# Patient Record
Sex: FEMALE | Race: BLACK OR AFRICAN AMERICAN | NOT HISPANIC OR LATINO | Employment: FULL TIME | URBAN - METROPOLITAN AREA
[De-identification: names, ages, dates, MRNs, and addresses within clinical notes are randomized per-mention and may not be internally consistent; named-entity substitution may affect disease eponyms.]

---

## 2017-06-25 ENCOUNTER — HOSPITAL ENCOUNTER (EMERGENCY)
Facility: HOSPITAL | Age: 47
Discharge: HOME | End: 2017-06-25
Attending: EMERGENCY MEDICINE
Payer: MEDICAID

## 2017-06-25 VITALS
TEMPERATURE: 99 F | HEART RATE: 82 BPM | OXYGEN SATURATION: 100 % | BODY MASS INDEX: 26.58 KG/M2 | HEIGHT: 63 IN | WEIGHT: 150 LBS | RESPIRATION RATE: 16 BRPM | SYSTOLIC BLOOD PRESSURE: 111 MMHG | DIASTOLIC BLOOD PRESSURE: 71 MMHG

## 2017-06-25 DIAGNOSIS — L01.00 IMPETIGO: Primary | ICD-10-CM

## 2017-06-25 LAB
B-HCG UR QL: NEGATIVE
CTP QC/QA: YES

## 2017-06-25 PROCEDURE — 81025 URINE PREGNANCY TEST: CPT | Performed by: EMERGENCY MEDICINE

## 2017-06-25 PROCEDURE — 99283 EMERGENCY DEPT VISIT LOW MDM: CPT

## 2017-06-25 RX ORDER — MUPIROCIN 20 MG/G
OINTMENT TOPICAL 3 TIMES DAILY
Qty: 30 G | Refills: 0 | Status: SHIPPED | OUTPATIENT
Start: 2017-06-25 | End: 2018-08-24

## 2017-06-26 NOTE — ED PROVIDER NOTES
"Encounter Date: 2017       History     Chief Complaint   Patient presents with    Abscess     multiple "boils" on my face x 1-2 weeks.  OTC meds not helping.     47 show female with past medical history anxiety/hypertension, presents the ED with chief complaint abscesses to face ×2 weeks.  Patient has been squeezing and expressing abscesses over the past week.  She denies fever or chills.  She denies intraoral lesions.  She denies abscesses to any other part of her body.  Patient denies radiation of symptoms.  Symptoms are constant.  No alleviating or exacerbating factors.  She denies any other symptoms.          Review of patient's allergies indicates:  No Known Allergies  Past Medical History:   Diagnosis Date    Anxiety     Hypertension      Past Surgical History:   Procedure Laterality Date     SECTION       Family History   Problem Relation Age of Onset    No Known Problems Mother     No Known Problems Father      Social History   Substance Use Topics    Smoking status: Current Every Day Smoker     Types: Cigarettes    Smokeless tobacco: Never Used    Alcohol use Yes      Comment: occ     Review of Systems   Constitutional: Negative for activity change, appetite change, chills and fever.   HENT: Negative for congestion, dental problem, facial swelling, rhinorrhea, sore throat and trouble swallowing.    Eyes: Negative for pain.   Respiratory: Negative for apnea, cough, chest tightness, shortness of breath, wheezing and stridor.    Cardiovascular: Negative for chest pain.   Gastrointestinal: Negative for abdominal pain, nausea and vomiting.   Endocrine: Negative.    Genitourinary: Negative for difficulty urinating, dysuria and flank pain.   Musculoskeletal: Negative for back pain, myalgias, neck pain and neck stiffness.   Skin: Positive for rash. Negative for color change, pallor and wound.   Allergic/Immunologic: Negative.    Neurological: Negative for dizziness, syncope, weakness, " light-headedness and headaches.   Hematological: Negative.    Psychiatric/Behavioral: Negative.    All other systems reviewed and are negative.      Physical Exam     Initial Vitals [06/25/17 1705]   BP Pulse Resp Temp SpO2   111/71 82 16 98.5 °F (36.9 °C) 100 %      MAP       84.33         Physical Exam    Nursing note and vitals reviewed.  Constitutional: She appears well-developed and well-nourished. She is not diaphoretic. No distress.   HENT:   Head: Normocephalic and atraumatic.       Eyes: Conjunctivae and EOM are normal. Pupils are equal, round, and reactive to light. Right eye exhibits no discharge. Left eye exhibits no discharge.   Neck: Normal range of motion. Neck supple.   Pulmonary/Chest: Breath sounds normal. No respiratory distress.   Abdominal: Soft. Bowel sounds are normal. She exhibits no distension. There is no tenderness.   Musculoskeletal: Normal range of motion. She exhibits no tenderness.   Neurological: She is alert and oriented to person, place, and time. She has normal strength.   Skin: Skin is warm and dry. Capillary refill takes less than 2 seconds. No rash noted. No erythema.   Psychiatric: She has a normal mood and affect. Her behavior is normal. Judgment and thought content normal.         ED Course   Procedures  Labs Reviewed   POCT URINE PREGNANCY             Medical Decision Making:   Initial Assessment:   47-year-old female chief complaint facial abscesses ×2 weeks.  Differential Diagnosis:   Abscess, cellulitis, impetigo, folliculitis  ED Management:  Patient overall well-appearing, in no acute distress, afebrile, vitals within normal limits.  Patient has been squeezing and expressing material from abscess is over the past week.  At this time, there is a very small, healing pustule to left cheek without surrounding erythema, induration, or fluctuance.  There is also a 1 cm diameter flat, wound to right lower jaw.  No obvious drainage, fluctuance, or induration.  I do not think  either of these wounds are amenable to incision and drainage.  No intraoral lesions.  Patient denies constitutional symptoms.  I suspect this is most likely impetigo, or secondary infection with MRSA.  I will discharge her with Bactroban ointment and have patient follow-up with her primary care physician this week.  She does have an appointment on Thursday.  I do believe this is very fortuitous.  Patient does understand and agree with treatment plan.  Return precautions given.  Other:   I have discussed this case with another health care provider.       <> Summary of the Discussion: I have discussed this case with Dr. Garza.                   ED Course     Clinical Impression:   The encounter diagnosis was Impetigo.                           Leonardo Ram PA-C  06/25/17 1930

## 2017-06-30 ENCOUNTER — HOSPITAL ENCOUNTER (EMERGENCY)
Facility: HOSPITAL | Age: 47
Discharge: HOME | End: 2017-06-30
Attending: EMERGENCY MEDICINE
Payer: MEDICAID

## 2017-06-30 VITALS
RESPIRATION RATE: 14 BRPM | HEIGHT: 63 IN | BODY MASS INDEX: 22.5 KG/M2 | WEIGHT: 127 LBS | TEMPERATURE: 99 F | OXYGEN SATURATION: 99 % | DIASTOLIC BLOOD PRESSURE: 64 MMHG | SYSTOLIC BLOOD PRESSURE: 112 MMHG | HEART RATE: 78 BPM

## 2017-06-30 DIAGNOSIS — R21 RASH: Primary | ICD-10-CM

## 2017-06-30 LAB
B-HCG UR QL: NEGATIVE
CTP QC/QA: YES

## 2017-06-30 PROCEDURE — 96372 THER/PROPH/DIAG INJ SC/IM: CPT

## 2017-06-30 PROCEDURE — 81025 URINE PREGNANCY TEST: CPT | Performed by: NURSE PRACTITIONER

## 2017-06-30 PROCEDURE — 63600175 PHARM REV CODE 636 W HCPCS: Performed by: NURSE PRACTITIONER

## 2017-06-30 PROCEDURE — 99283 EMERGENCY DEPT VISIT LOW MDM: CPT | Mod: 25

## 2017-06-30 RX ORDER — DEXAMETHASONE SODIUM PHOSPHATE 4 MG/ML
8 INJECTION, SOLUTION INTRA-ARTICULAR; INTRALESIONAL; INTRAMUSCULAR; INTRAVENOUS; SOFT TISSUE
Status: COMPLETED | OUTPATIENT
Start: 2017-06-30 | End: 2017-06-30

## 2017-06-30 RX ORDER — HYDROCORTISONE VALERATE CREAM 2 MG/G
CREAM TOPICAL 2 TIMES DAILY
Qty: 15 G | Refills: 0 | Status: SHIPPED | OUTPATIENT
Start: 2017-06-30 | End: 2017-07-10

## 2017-06-30 RX ORDER — HYDROXYZINE HYDROCHLORIDE 25 MG/1
25 TABLET, FILM COATED ORAL EVERY 6 HOURS PRN
Qty: 12 TABLET | Refills: 0 | Status: SHIPPED | OUTPATIENT
Start: 2017-06-30 | End: 2018-08-24

## 2017-06-30 RX ADMIN — DEXAMETHASONE SODIUM PHOSPHATE 8 MG: 4 INJECTION, SOLUTION INTRAMUSCULAR; INTRAVENOUS at 10:06

## 2017-07-01 NOTE — ED PROVIDER NOTES
Encounter Date: 2017    SCRIBE #1 NOTE: I, Juan Oliva, am scribing for, and in the presence of,  Daily De Leon NP. I have scribed the following portions of the note - Other sections scribed: HPI and ROS.       History     Chief Complaint   Patient presents with    Rash     Patient states that she has a rash from warren mupirocin.      CC: Rash    HPI: This 47 y.o. Female with hx of anxiety and HTN presents to ED c/o acute onset rash with associating itching, burning, and stinging. Pt reports neck rash appeared 4 days ago after applying cream to treat a staph infection. She used bactroban on her face, but the rash appeared on her arms and neck. Last night, rash spread bilaterally to pt's shoulders, armpits, inner elbows, and forearms. Pt did not apply cream to anywhere but her face. Pt has not recently changed her lotions, detergents, or other products. Attempted tx by taking benadryl and neosporin with no relief. Pt denies n/v, fever, cough, weakness, and diaphoresis. No further symptoms reported. No alleviating factors.      The history is provided by the patient. No  was used.     Review of patient's allergies indicates:  No Known Allergies  Past Medical History:   Diagnosis Date    Anxiety     Hypertension      Past Surgical History:   Procedure Laterality Date     SECTION       Family History   Problem Relation Age of Onset    No Known Problems Mother     No Known Problems Father      Social History   Substance Use Topics    Smoking status: Current Every Day Smoker     Types: Cigarettes    Smokeless tobacco: Never Used    Alcohol use Yes      Comment: occ     Review of Systems   Constitutional: Negative for diaphoresis and fever.   HENT: Negative for sore throat.    Respiratory: Negative for cough and shortness of breath.    Cardiovascular: Negative for chest pain.   Gastrointestinal: Negative for nausea and vomiting.   Genitourinary: Negative for dysuria.    Musculoskeletal: Negative for back pain.   Skin: Positive for rash (neck, elbow, forearms, shoulders).   Neurological: Negative for weakness.   Hematological: Does not bruise/bleed easily.       Physical Exam     Initial Vitals [06/30/17 2104]   BP Pulse Resp Temp SpO2   (!) 115/58 88 16 99.3 °F (37.4 °C) 100 %      MAP       77         Physical Exam    Constitutional: Vital signs are normal. She appears well-developed and well-nourished.  Non-toxic appearance.   Eyes: EOM are normal.   Neck: Full passive range of motion without pain. Neck supple. No neck rigidity.   Cardiovascular: Normal rate, S1 normal, S2 normal and normal heart sounds. Exam reveals no gallop.    No murmur heard.  Pulmonary/Chest: Effort normal and breath sounds normal. No tachypnea. She has no decreased breath sounds. She has no wheezes. She has no rhonchi. She has no rales.   Neurological: She is alert and oriented to person, place, and time. She has normal strength. Gait normal. GCS eye subscore is 4. GCS verbal subscore is 5. GCS motor subscore is 6.   Skin: Skin is warm and dry. Rash noted. Rash is maculopapular.              ED Course   Procedures  Labs Reviewed   POCT URINE PREGNANCY             Medical Decision Making:   ED Management:  This is a 47-year-old female who presents to the ED with complaints of a pruritic rash.  She is afebrile and well-appearing.  She suspects this may be due to a Bactroban ALLERGY however, the rash appeared where she did not use the ointment.  On exam, there is a maculopapular rash to the neck and inner arms bilaterally.  No vesicles, induration or open wounds.  Decadron injection given.  No evidence to suggest cellulitis, varicella or other serious etiology.  Discharged home with prescriptions for hydroxyzine and Westcort cream.  Instructions given for supportive care and follow-up.  Return precautions given.  Patient's case was discussed with Dr. Ballesteros, who agrees with her plan.            Tunge  Attestation:   Scribe #1: I performed the above scribed service and the documentation accurately describes the services I performed. I attest to the accuracy of the note.    Attending Attestation:           Physician Attestation for Scribe:  Physician Attestation Statement for Scribe #1: I, Daily De Leon NP, reviewed documentation, as scribed by Juan Oliva in my presence, and it is both accurate and complete.                 ED Course     Clinical Impression:   The encounter diagnosis was Rash.    Disposition:   Disposition: Discharged  Condition: Stable                        Daily De Leon NP  07/01/17 0307

## 2017-07-01 NOTE — DISCHARGE INSTRUCTIONS
Please return to the ED for any new or worsening symptoms: chest pain, shortness of breath, loss of consciousness or any other concerns. Please follow up with primary care within in the week. You may also call [phone] for the [facility] same day appointment line.

## 2017-07-01 NOTE — ED TRIAGE NOTES
Pt presents to the ED w/ a rash. Patient reports she was seen on Monday and dx w/ staff infectons. Pt reports she now has a raised rash to her neck and arms. Patient reports itching and pain.

## 2017-08-22 ENCOUNTER — HOSPITAL ENCOUNTER (EMERGENCY)
Facility: HOSPITAL | Age: 47
Discharge: HOME | End: 2017-08-22
Attending: EMERGENCY MEDICINE

## 2017-08-22 VITALS
TEMPERATURE: 99 F | HEIGHT: 69 IN | SYSTOLIC BLOOD PRESSURE: 141 MMHG | DIASTOLIC BLOOD PRESSURE: 79 MMHG | RESPIRATION RATE: 18 BRPM | WEIGHT: 123 LBS | HEART RATE: 77 BPM | BODY MASS INDEX: 18.22 KG/M2

## 2017-08-22 DIAGNOSIS — R21 FACIAL RASH: Primary | ICD-10-CM

## 2017-08-22 PROCEDURE — 99283 EMERGENCY DEPT VISIT LOW MDM: CPT

## 2017-08-22 RX ORDER — METHYLPREDNISOLONE 4 MG/1
TABLET ORAL
Qty: 1 PACKAGE | Refills: 0 | Status: SHIPPED | OUTPATIENT
Start: 2017-08-22 | End: 2018-08-24

## 2017-08-23 NOTE — ED TRIAGE NOTES
Patient has had a reoccurring staph infection to the right side of her cheek. Patient states that she was seen here but never went and got the prescription filled.

## 2017-08-23 NOTE — DISCHARGE INSTRUCTIONS
Stop using topical therapies on your face.  Warm compresses to face only.  Use medications as prescribed

## 2017-08-23 NOTE — ED PROVIDER NOTES
Encounter Date: 2017       History     Chief Complaint   Patient presents with    Rash     open wound to right bottom cheek x 2days.  States a reoccuring infection from antibiotics months ago.     Chief complaint: Rash    History of present illness: Patient is a [age] female presents for emergent consideration of rash present for 2 months.  She states that the rash is present to the right side of the bottom of her facial cheek.  It is constantly present and is itching and burning in character.  She has tried Neosporin, prid, steroid injections, alcohol, peroxide.  She's been prescribed oral antibiotics but did not take them.    The history is provided by the patient. No  was used.     Review of patient's allergies indicates:  No Known Allergies  Past Medical History:   Diagnosis Date    Anxiety     Hypertension      Past Surgical History:   Procedure Laterality Date     SECTION       Family History   Problem Relation Age of Onset    No Known Problems Mother     No Known Problems Father      Social History   Substance Use Topics    Smoking status: Current Every Day Smoker     Types: Cigarettes    Smokeless tobacco: Never Used    Alcohol use Yes      Comment: occ     Review of Systems   Constitutional: Negative for chills, fatigue and fever.   HENT: Negative for congestion, ear discharge, ear pain, postnasal drip, rhinorrhea, sinus pressure, sneezing, sore throat and voice change.    Eyes: Negative for discharge and itching.   Respiratory: Negative for cough, shortness of breath and wheezing.    Cardiovascular: Negative for chest pain, palpitations and leg swelling.   Gastrointestinal: Negative for abdominal pain, constipation, diarrhea, nausea and vomiting.   Endocrine: Negative for polydipsia, polyphagia and polyuria.   Genitourinary: Negative for dysuria, frequency, hematuria, urgency, vaginal bleeding, vaginal discharge and vaginal pain.   Musculoskeletal: Negative for  arthralgias and myalgias.   Skin: Positive for rash. Negative for wound.   Neurological: Negative for dizziness, seizures, syncope, weakness and numbness.   Hematological: Negative for adenopathy. Does not bruise/bleed easily.   Psychiatric/Behavioral: Negative for self-injury and suicidal ideas. The patient is not nervous/anxious.        Physical Exam     Initial Vitals [08/22/17 1903]   BP Pulse Resp Temp SpO2   (!) 141/79 77 18 98.9 °F (37.2 °C) --      MAP       99.67         Physical Exam    Nursing note and vitals reviewed.  Constitutional: She appears well-developed and well-nourished.   HENT:   Head: Normocephalic and atraumatic.   Right Ear: External ear normal.   Left Ear: External ear normal.   Nose: Nose normal.   Eyes: Conjunctivae and EOM are normal. Pupils are equal, round, and reactive to light. Right eye exhibits no discharge. Left eye exhibits no discharge.   Neck: Normal range of motion.   Abdominal: She exhibits no distension.   Musculoskeletal: Normal range of motion.   Neurological: She is alert and oriented to person, place, and time.   Skin: Skin is dry. Capillary refill takes less than 2 seconds. Rash (kin denuded from an irregular area of the right facial cheek approximately 4 cm x 2 cm, and along the belt line.) noted.         ED Course   Procedures  Labs Reviewed - No data to display          Medical Decision Making:   Initial Assessment:   47 y.o. female presents for emergent evaluation of rash  ED Management:  Following a thorough history and physical, the patient was given a prescription for Medrol Dosepak and told to stop using topical medications and treatments for her face other than warm compresses.  She was given the name of Dr. Lindsay, dermatology for follow-up.  Given the duration of which this rash is been present, it most likely does not represent an emergent illness.  Care of the patient discussed with Dr. Handley who agreed with the assessment and plan.                 Attending Attestation:     Physician Attestation Statement for NP/PA:   I have conducted a face to face encounter with this patient in addition to the NP/PA, due to NP/PA Request    Other NP/PA Attestation Additions:      Medical Decision Makin-year-old female with recurrent rash on the cheek.  She also has a lesion on her waistline.  Patient has been putting numerous sentences on the lesions.  No obvious significant bacterial infection.  No evidence of abscess.  I agree with plan.                 ED Course     Clinical Impression:   The encounter diagnosis was Facial rash.    Disposition:   Disposition: Discharged  Condition: Stable                        Ar Hart DNP  17       Alvaro Handley MD  17

## 2017-11-04 ENCOUNTER — HOSPITAL ENCOUNTER (EMERGENCY)
Facility: HOSPITAL | Age: 47
Discharge: HOME | End: 2017-11-04
Attending: EMERGENCY MEDICINE
Payer: MEDICAID

## 2017-11-04 VITALS
BODY MASS INDEX: 22.15 KG/M2 | HEART RATE: 73 BPM | SYSTOLIC BLOOD PRESSURE: 131 MMHG | WEIGHT: 125 LBS | HEIGHT: 63 IN | RESPIRATION RATE: 16 BRPM | TEMPERATURE: 99 F | OXYGEN SATURATION: 100 % | DIASTOLIC BLOOD PRESSURE: 85 MMHG

## 2017-11-04 DIAGNOSIS — R10.11 RIGHT UPPER QUADRANT PAIN: ICD-10-CM

## 2017-11-04 DIAGNOSIS — R14.2 BELCHING: ICD-10-CM

## 2017-11-04 DIAGNOSIS — R10.13 EPIGASTRIC PAIN: ICD-10-CM

## 2017-11-04 DIAGNOSIS — D64.9 ANEMIA, UNSPECIFIED TYPE: ICD-10-CM

## 2017-11-04 DIAGNOSIS — R07.9 CHEST PAIN: Primary | ICD-10-CM

## 2017-11-04 LAB
ALBUMIN SERPL BCP-MCNC: 3.6 G/DL
ALP SERPL-CCNC: 71 U/L
ALT SERPL W/O P-5'-P-CCNC: 12 U/L
ANION GAP SERPL CALC-SCNC: 8 MMOL/L
AST SERPL-CCNC: 15 U/L
B-HCG UR QL: NEGATIVE
BASOPHILS # BLD AUTO: 0.03 K/UL
BASOPHILS NFR BLD: 0.3 %
BILIRUB SERPL-MCNC: 0.2 MG/DL
BNP SERPL-MCNC: 33 PG/ML
BUN SERPL-MCNC: 16 MG/DL
CALCIUM SERPL-MCNC: 9.3 MG/DL
CHLORIDE SERPL-SCNC: 107 MMOL/L
CO2 SERPL-SCNC: 24 MMOL/L
CREAT SERPL-MCNC: 0.9 MG/DL
CTP QC/QA: YES
D DIMER PPP IA.FEU-MCNC: 0.29 MG/L FEU
DIFFERENTIAL METHOD: ABNORMAL
EOSINOPHIL # BLD AUTO: 0.1 K/UL
EOSINOPHIL NFR BLD: 1.6 %
ERYTHROCYTE [DISTWIDTH] IN BLOOD BY AUTOMATED COUNT: 20.4 %
EST. GFR  (AFRICAN AMERICAN): >60 ML/MIN/1.73 M^2
EST. GFR  (NON AFRICAN AMERICAN): >60 ML/MIN/1.73 M^2
GLUCOSE SERPL-MCNC: 89 MG/DL
HCT VFR BLD AUTO: 26.3 %
HGB BLD-MCNC: 8.6 G/DL
LIPASE SERPL-CCNC: 35 U/L
LYMPHOCYTES # BLD AUTO: 2.2 K/UL
LYMPHOCYTES NFR BLD: 25.3 %
MCH RBC QN AUTO: 20.5 PG
MCHC RBC AUTO-ENTMCNC: 32.7 G/DL
MCV RBC AUTO: 63 FL
MONOCYTES # BLD AUTO: 0.8 K/UL
MONOCYTES NFR BLD: 9 %
NEUTROPHILS # BLD AUTO: 5.5 K/UL
NEUTROPHILS NFR BLD: 63.8 %
PLATELET # BLD AUTO: 452 K/UL
PMV BLD AUTO: 10.3 FL
POTASSIUM SERPL-SCNC: 4 MMOL/L
PROT SERPL-MCNC: 7.4 G/DL
RBC # BLD AUTO: 4.19 M/UL
SODIUM SERPL-SCNC: 139 MMOL/L
TROPONIN I SERPL DL<=0.01 NG/ML-MCNC: <0.006 NG/ML
WBC # BLD AUTO: 8.62 K/UL

## 2017-11-04 PROCEDURE — 93010 ELECTROCARDIOGRAM REPORT: CPT | Mod: ,,, | Performed by: INTERNAL MEDICINE

## 2017-11-04 PROCEDURE — 93005 ELECTROCARDIOGRAM TRACING: CPT

## 2017-11-04 PROCEDURE — 25000003 PHARM REV CODE 250: Performed by: EMERGENCY MEDICINE

## 2017-11-04 PROCEDURE — 99284 EMERGENCY DEPT VISIT MOD MDM: CPT

## 2017-11-04 PROCEDURE — 85025 COMPLETE CBC W/AUTO DIFF WBC: CPT

## 2017-11-04 PROCEDURE — 80053 COMPREHEN METABOLIC PANEL: CPT

## 2017-11-04 PROCEDURE — 83690 ASSAY OF LIPASE: CPT

## 2017-11-04 PROCEDURE — 85379 FIBRIN DEGRADATION QUANT: CPT

## 2017-11-04 PROCEDURE — 83880 ASSAY OF NATRIURETIC PEPTIDE: CPT

## 2017-11-04 PROCEDURE — 84484 ASSAY OF TROPONIN QUANT: CPT

## 2017-11-04 PROCEDURE — 81025 URINE PREGNANCY TEST: CPT | Performed by: EMERGENCY MEDICINE

## 2017-11-04 RX ORDER — ESOMEPRAZOLE MAGNESIUM 40 MG/1
40 CAPSULE, DELAYED RELEASE ORAL DAILY
Qty: 30 CAPSULE | Refills: 2 | Status: SHIPPED | OUTPATIENT
Start: 2017-11-04 | End: 2018-08-24

## 2017-11-04 RX ORDER — DOCUSATE SODIUM 100 MG/1
100 CAPSULE, LIQUID FILLED ORAL 2 TIMES DAILY PRN
Qty: 60 CAPSULE | Refills: 2 | Status: SHIPPED | OUTPATIENT
Start: 2017-11-04

## 2017-11-04 RX ORDER — FERROUS SULFATE 325(65) MG
325 TABLET ORAL DAILY
Qty: 30 TABLET | Refills: 2 | Status: SHIPPED | OUTPATIENT
Start: 2017-11-04

## 2017-11-04 RX ORDER — ASPIRIN 81 MG/1
81 TABLET ORAL DAILY
COMMUNITY

## 2017-11-04 RX ADMIN — LIDOCAINE HYDROCHLORIDE: 20 SOLUTION ORAL; TOPICAL at 09:11

## 2017-11-05 NOTE — ED TRIAGE NOTES
Pt presents to ED with c/o epigastric and chest pain that radiates laterally under the right breast to the right midaxillary region and to the back. Pt reports that the pain started yesterday and she's never felt this kind of way.

## 2017-11-05 NOTE — ED PROVIDER NOTES
Encounter Date: 2017    SCRIBE #1 NOTE: I, Elin Mclaughlin, am scribing for, and in the presence of,  Gloria Olvera MD. I have scribed the following portions of the note - Other sections scribed: HPI, ROS, PE.       History     Chief Complaint   Patient presents with    Chest Pain     starting last night pt complains of pain from chest to the back that gets worse when she belch; pt reports taking an aspirin around noon with little to no relief.      CC: Abdominal Pain; Chest Pain    HPI: 47 year old female smoker with HTN, acid reflux, anxiety, hx of anemia, and hx of blood transfusion, presents to the ED c/o epigastric and RUQ abdominal pain that radiates to her R flank and chest since yesterday night while at work. Pain is 8/10. Pt describes the pain as pressure and having a burning sensation similar to her reflux. Pain is exacerbated with deep breathing. Pt works at a [employer]. She states she does a lot of walking and movement at work but has been working at the [employer] for 8 years and none of this activity is new. Pt denies previous similar pain. Pt reports she was diagnosed with acid reflux a year ago and was on nexium but stopped taking the medication several months back. Pt denies eating any unusual or spicy foods that would exacerbate her pain. Pt otherwise denies fever, chills, diaphoresis, weight change, SOB, N/V/D, changes in urinary/bowel habits, and any other associated symptoms. No prior attempted treatment. No alleviating factors.    Pt reports having heavy menstrual cycles that last for 8-10 days. She does have a hx of fibroids.     PSHx: tubal ligation,       The history is provided by the patient. No  was used.     Review of patient's allergies indicates:  No Known Allergies  Past Medical History:   Diagnosis Date    Anxiety     Hypertension      Past Surgical History:   Procedure Laterality Date     SECTION       Family History   Problem Relation  Age of Onset    No Known Problems Mother     No Known Problems Father      Social History   Substance Use Topics    Smoking status: Current Every Day Smoker     Types: Cigarettes    Smokeless tobacco: Never Used    Alcohol use Yes      Comment: occ     Review of Systems   Constitutional: Negative for chills, diaphoresis, fever and unexpected weight change.   HENT: Negative for ear pain and sore throat.    Eyes: Negative for photophobia and visual disturbance.   Respiratory: Negative for cough and shortness of breath.    Cardiovascular: Positive for chest pain.   Gastrointestinal: Positive for abdominal pain (epigastric and RUQ). Negative for diarrhea, nausea and vomiting.   Genitourinary: Positive for flank pain (R) and menstrual problem. Negative for dysuria.   Musculoskeletal: Negative for back pain and neck stiffness.   Skin: Negative for rash.   Neurological: Negative for headaches.       Physical Exam     Initial Vitals [11/04/17 2004]   BP Pulse Resp Temp SpO2   118/70 80 17 98.5 °F (36.9 °C) 100 %      MAP       86         Physical Exam    Nursing note and vitals reviewed.  Constitutional: She appears well-developed and well-nourished. She is not diaphoretic. She does not appear ill. No distress.   Awake, alert, appears non-toxic   HENT:   Head: Normocephalic and atraumatic.   Mouth/Throat: Oropharynx is clear and moist.   Eyes: Conjunctivae and EOM are normal. Pupils are equal, round, and reactive to light.   Neck: Normal range of motion. Neck supple.   Cardiovascular: Normal rate and regular rhythm.   Murmur heard.   Systolic murmur is present with a grade of 2/6   Pulmonary/Chest: Effort normal and breath sounds normal. No respiratory distress. She has no wheezes. She has no rhonchi. She has no rales.   Abdominal: Soft. Normal appearance and bowel sounds are normal. She exhibits no distension. There is tenderness in the epigastric area. There is no rebound and no guarding.   Musculoskeletal: Normal  range of motion. She exhibits no edema or tenderness.   Neurological: She is alert and oriented to person, place, and time. She has normal strength.   Skin: Skin is warm and dry.   Psychiatric: She has a normal mood and affect.         ED Course   US - ED Performed - Abdomen Limited  Date/Time: 11/4/2017 11:00 PM  Performed by: KOSTA WAYNE  Authorized by: KOSTA WAYNE   Comments: RUQ no gallstones. No pericholecystic fluid. No GB wall thickening.        Labs Reviewed   CBC W/ AUTO DIFFERENTIAL   COMPREHENSIVE METABOLIC PANEL   B-TYPE NATRIURETIC PEPTIDE   TROPONIN I   LIPASE   D DIMER, QUANTITATIVE   POCT URINE PREGNANCY     EKG Readings: (Independently Interpreted)   20:10: NSR, HR 73. Normal axis and intervals. No STEMI.       X-Rays:   Independently Interpreted Readings:   Other Readings:  CXR NAD    Medical Decision Making:   History:   Old Medical Records: I decided to obtain old medical records.  Old Records Summarized: records from previous admission(s).  Initial Assessment:   This is an emergent evaluation of a 47 y.o. Female with RUQ and epigastric and chest pain since last PM.  Differential Diagnosis:   Ddx includes ACS, dissection, PE, CHF, GERD, PUD, pancreatitis, biliary colic, choledocholithiasis, cholangitis, cholecystitis, other.  Independently Interpreted Test(s):   I have ordered and independently interpreted X-rays - see prior notes.  I have ordered and independently interpreted EKG Reading(s) - see prior notes  Clinical Tests:   Lab Tests: Ordered and Reviewed  Radiological Study: Ordered and Reviewed  Medical Tests: Ordered and Reviewed  ED Management:  EKG reassuring without STEMI.    CXR without abnormality.    Labs: Hgb 8.6. Negative troponin/BNP/dimer.    Given negative troponin in setting of pain since last PM, low suspicion for ACS.    RUQ sono bedside shows no GB pathology.    Patient pain improved with GI cocktail.    Suspect anemia is somewhat chronic as no  palpitations/SOB, not tachy in ED. Will re-start iron therapy.    Re: abdom pain, suspect gastritis vs GERD vs PUD.     I have rx'ed patient PPI as well as iron/colace for anemia. Referred to PMD for close f/u.            Scribe Attestation:   Scribe #1: I performed the above scribed service and the documentation accurately describes the services I performed. I attest to the accuracy of the note.    Attending Attestation:           Physician Attestation for Scribe:  Physician Attestation Statement for Scribe #1: I, Gloria Olvera MD, reviewed documentation, as scribed by Elin Mclaughlin in my presence, and it is both accurate and complete.                 ED Course      Clinical Impression:   The primary encounter diagnosis was Chest pain. Diagnoses of Right upper quadrant pain, Epigastric pain, Belching, and Anemia, unspecified type were also pertinent to this visit.                           Gloria Olvera MD  11/06/17 0959

## 2018-07-13 ENCOUNTER — HOSPITAL ENCOUNTER (EMERGENCY)
Facility: HOSPITAL | Age: 48
Discharge: HOME | End: 2018-07-13
Attending: EMERGENCY MEDICINE
Payer: MEDICAID

## 2018-07-13 VITALS
HEART RATE: 87 BPM | DIASTOLIC BLOOD PRESSURE: 60 MMHG | WEIGHT: 120 LBS | BODY MASS INDEX: 22.66 KG/M2 | HEIGHT: 61 IN | OXYGEN SATURATION: 100 % | TEMPERATURE: 99 F | SYSTOLIC BLOOD PRESSURE: 108 MMHG | RESPIRATION RATE: 20 BRPM

## 2018-07-13 DIAGNOSIS — R21 FACIAL RASH: Primary | ICD-10-CM

## 2018-07-13 LAB
B-HCG UR QL: NEGATIVE
CTP QC/QA: YES

## 2018-07-13 PROCEDURE — 99283 EMERGENCY DEPT VISIT LOW MDM: CPT

## 2018-07-13 PROCEDURE — 25000003 PHARM REV CODE 250: Performed by: PHYSICIAN ASSISTANT

## 2018-07-13 PROCEDURE — 81025 URINE PREGNANCY TEST: CPT | Performed by: PHYSICIAN ASSISTANT

## 2018-07-13 RX ORDER — MUPIROCIN 20 MG/G
1 OINTMENT TOPICAL
Status: COMPLETED | OUTPATIENT
Start: 2018-07-13 | End: 2018-07-13

## 2018-07-13 RX ORDER — MUPIROCIN 20 MG/G
OINTMENT TOPICAL
Qty: 15 G | Refills: 0 | Status: SHIPPED | OUTPATIENT
Start: 2018-07-13 | End: 2018-08-24

## 2018-07-13 RX ADMIN — MUPIROCIN 22 G: 20 OINTMENT TOPICAL at 07:07

## 2018-07-13 NOTE — ED PROVIDER NOTES
Encounter Date: 2018     SORT:   49 y/o female who presents for evaluation of itchy, painful rash to face x 11 days. She also states that she had a pimple on her face 7 days ago that she popped. Denies fever, vomiting, drainage, difficulty breathing or swallowing. Initial orders placed. Lacey Masters PA-C   History   No chief complaint on file.    48-year-old female with past medical history anxiety, hypertension, presents to ED complaining of rash to right cheek ×11 days.  Denies fever. Denies facial swelling. No wound drainage. Denies trauma. Pt states initially a pimple-like lesion, that is now worsening rash. No radiation of pain. No alleviating/exacerbating factors.           Review of patient's allergies indicates:  No Known Allergies  Past Medical History:   Diagnosis Date    Anxiety     Hypertension      Past Surgical History:   Procedure Laterality Date     SECTION       Family History   Problem Relation Age of Onset    No Known Problems Mother     No Known Problems Father      Social History   Substance Use Topics    Smoking status: Current Every Day Smoker     Types: Cigarettes    Smokeless tobacco: Never Used    Alcohol use Yes      Comment: occ     Review of Systems   Constitutional: Negative for fever.   HENT: Negative for sore throat.    Eyes: Negative.    Respiratory: Negative for shortness of breath.    Cardiovascular: Negative for chest pain.   Gastrointestinal: Negative for nausea.   Endocrine: Negative.    Genitourinary: Negative for dysuria.   Musculoskeletal: Negative for back pain, neck pain and neck stiffness.   Skin: Positive for rash.   Neurological: Negative for weakness and headaches.   Hematological: Does not bruise/bleed easily.   Psychiatric/Behavioral: Negative.    All other systems reviewed and are negative.      Physical Exam     Initial Vitals [18 1849]   BP Pulse Resp Temp SpO2   108/60 87 20 99.1 °F (37.3 °C) 100 %      MAP       --         Physical  "Exam    Nursing note and vitals reviewed.  Constitutional: She appears well-developed and well-nourished. She is not diaphoretic. No distress.   HENT:   Head: Normocephalic and atraumatic.   Nose:       Eyes: Conjunctivae and EOM are normal. Pupils are equal, round, and reactive to light.   Neck: Normal range of motion. Neck supple. No tracheal deviation present.   Cardiovascular: Intact distal pulses.   Pulmonary/Chest: Breath sounds normal. No stridor. No respiratory distress. She has no wheezes.   Abdominal: Soft. Bowel sounds are normal. She exhibits no distension.   Musculoskeletal: Normal range of motion. She exhibits no tenderness.   Lymphadenopathy:     She has no cervical adenopathy.   Neurological: She is alert and oriented to person, place, and time.   Skin: Skin is warm and dry. Capillary refill takes less than 2 seconds.   Psychiatric: She has a normal mood and affect. Her behavior is normal. Judgment and thought content normal.         ED Course   Procedures  Labs Reviewed   POCT URINE PREGNANCY           Medical Decision Making:   Differential Diagnosis:   Rash, dermatitis, impetigo, abscess, folliculitis  ED Management:  47yo F with facial rash x 11 days. Rash is not pruritic, not significantly painful. No drainage. Initially pimple, now small wound. Vitals reassuring. Overall well-appearing and non-toxic. No intraoral lesions, no other rash on exam. No fever. No neck pain or stiffness. I do not suspect this will spread into "danger triangle", however have given pt strict return precautions if any worsening rash. Will d/c with topical mupirocin, return precautions given.                       Clinical Impression:   The encounter diagnosis was Facial rash.      Disposition:   Disposition: Discharged  Condition: Stable                        Leonardo Ram PA-C  07/13/18 6269    "

## 2018-07-14 NOTE — ED TRIAGE NOTES
Patient arrived to ED with c/o rash around mouth and on face x 13 days that itches.  Denies new meds, food, or makeup.  No acute distress noted.

## 2018-08-24 ENCOUNTER — HOSPITAL ENCOUNTER (EMERGENCY)
Facility: HOSPITAL | Age: 48
Discharge: HOME | End: 2018-08-24
Attending: EMERGENCY MEDICINE
Payer: MEDICAID

## 2018-08-24 VITALS
SYSTOLIC BLOOD PRESSURE: 140 MMHG | RESPIRATION RATE: 16 BRPM | WEIGHT: 120 LBS | BODY MASS INDEX: 21.26 KG/M2 | DIASTOLIC BLOOD PRESSURE: 80 MMHG | HEART RATE: 64 BPM | TEMPERATURE: 99 F | OXYGEN SATURATION: 100 % | HEIGHT: 63 IN

## 2018-08-24 DIAGNOSIS — R07.89 CHEST DISCOMFORT: ICD-10-CM

## 2018-08-24 DIAGNOSIS — R07.89 ATYPICAL CHEST PAIN: Primary | ICD-10-CM

## 2018-08-24 DIAGNOSIS — M79.18 MUSCULOSKELETAL PAIN: ICD-10-CM

## 2018-08-24 DIAGNOSIS — R07.9 CHEST PAIN: ICD-10-CM

## 2018-08-24 LAB
ALBUMIN SERPL BCP-MCNC: 3.8 G/DL
ALP SERPL-CCNC: 54 U/L
ALT SERPL W/O P-5'-P-CCNC: 13 U/L
ANION GAP SERPL CALC-SCNC: 6 MMOL/L
ANISOCYTOSIS BLD QL SMEAR: ABNORMAL
AST SERPL-CCNC: 18 U/L
BASOPHILS # BLD AUTO: 0.01 K/UL
BASOPHILS NFR BLD: 0.1 %
BILIRUB SERPL-MCNC: 0.2 MG/DL
BUN SERPL-MCNC: 15 MG/DL
CALCIUM SERPL-MCNC: 8.9 MG/DL
CHLORIDE SERPL-SCNC: 108 MMOL/L
CO2 SERPL-SCNC: 23 MMOL/L
CREAT SERPL-MCNC: 0.9 MG/DL
DACRYOCYTES BLD QL SMEAR: ABNORMAL
DIFFERENTIAL METHOD: ABNORMAL
EOSINOPHIL # BLD AUTO: 0.1 K/UL
EOSINOPHIL NFR BLD: 1.6 %
ERYTHROCYTE [DISTWIDTH] IN BLOOD BY AUTOMATED COUNT: 24 %
EST. GFR  (AFRICAN AMERICAN): >60 ML/MIN/1.73 M^2
EST. GFR  (NON AFRICAN AMERICAN): >60 ML/MIN/1.73 M^2
GLUCOSE SERPL-MCNC: 96 MG/DL
HCT VFR BLD AUTO: 29.8 %
HGB BLD-MCNC: 9.4 G/DL
HYPOCHROMIA BLD QL SMEAR: ABNORMAL
LIPASE SERPL-CCNC: 25 U/L
LYMPHOCYTES # BLD AUTO: 1.5 K/UL
LYMPHOCYTES NFR BLD: 18.4 %
MCH RBC QN AUTO: 21.1 PG
MCHC RBC AUTO-ENTMCNC: 31.5 G/DL
MCV RBC AUTO: 67 FL
MONOCYTES # BLD AUTO: 0.6 K/UL
MONOCYTES NFR BLD: 7.9 %
NEUTROPHILS # BLD AUTO: 5.8 K/UL
NEUTROPHILS NFR BLD: 72.3 %
OVALOCYTES BLD QL SMEAR: ABNORMAL
PLATELET # BLD AUTO: 490 K/UL
PLATELET BLD QL SMEAR: ABNORMAL
PMV BLD AUTO: 9.3 FL
POIKILOCYTOSIS BLD QL SMEAR: SLIGHT
POLYCHROMASIA BLD QL SMEAR: ABNORMAL
POTASSIUM SERPL-SCNC: 3.9 MMOL/L
PROT SERPL-MCNC: 7 G/DL
RBC # BLD AUTO: 4.46 M/UL
SODIUM SERPL-SCNC: 137 MMOL/L
TARGETS BLD QL SMEAR: ABNORMAL
TROPONIN I SERPL DL<=0.01 NG/ML-MCNC: <0.006 NG/ML
WBC # BLD AUTO: 7.99 K/UL

## 2018-08-24 PROCEDURE — 96374 THER/PROPH/DIAG INJ IV PUSH: CPT

## 2018-08-24 PROCEDURE — 84484 ASSAY OF TROPONIN QUANT: CPT

## 2018-08-24 PROCEDURE — 93010 ELECTROCARDIOGRAM REPORT: CPT | Mod: ,,, | Performed by: INTERNAL MEDICINE

## 2018-08-24 PROCEDURE — 63600175 PHARM REV CODE 636 W HCPCS: Performed by: EMERGENCY MEDICINE

## 2018-08-24 PROCEDURE — 93005 ELECTROCARDIOGRAM TRACING: CPT

## 2018-08-24 PROCEDURE — 80053 COMPREHEN METABOLIC PANEL: CPT

## 2018-08-24 PROCEDURE — 99284 EMERGENCY DEPT VISIT MOD MDM: CPT | Mod: 25

## 2018-08-24 PROCEDURE — 85025 COMPLETE CBC W/AUTO DIFF WBC: CPT

## 2018-08-24 PROCEDURE — 83690 ASSAY OF LIPASE: CPT

## 2018-08-24 RX ORDER — KETOROLAC TROMETHAMINE 30 MG/ML
15 INJECTION, SOLUTION INTRAMUSCULAR; INTRAVENOUS
Status: COMPLETED | OUTPATIENT
Start: 2018-08-24 | End: 2018-08-24

## 2018-08-24 RX ORDER — METHOCARBAMOL 500 MG/1
1000 TABLET, FILM COATED ORAL 3 TIMES DAILY
Qty: 30 TABLET | Refills: 0 | Status: SHIPPED | OUTPATIENT
Start: 2018-08-24 | End: 2018-08-29

## 2018-08-24 RX ADMIN — KETOROLAC TROMETHAMINE 15 MG: 30 INJECTION, SOLUTION INTRAMUSCULAR at 10:08

## 2018-08-24 NOTE — ED TRIAGE NOTES
Pt presents to ED with c/o left sided lower chest pain under left breast onset this AM. Pt states pain is worse with movement and radiates to left back. Pt denies SOB, nausea, vomiting or dizziness.

## 2018-08-24 NOTE — ED PROVIDER NOTES
"Encounter Date: 2018    SCRIBE #1 NOTE: I, Hiren Schuler, am scribing for, and in the presence of,  Arturo Beck. I have scribed the following portions of the note - Other sections scribed: HPI and ROS.       History     Chief Complaint   Patient presents with    Chest Pain     left side CP radiating to left back, worse with movement, denies SOB, nausea or vomiting     Chief Complaint: Back Pain    HPI: This 48 y.o. Female with anxiety and HTN presents to the ED c/o acute onset mid thoracic back pain. Symptoms began upon wakening this morning and "rolling over". Pain radiates into the chest. Symptoms are constant, severe, and worse with deep breaths and positional movement. No attempted treatment. No fever, SOB or cough.      The history is provided by the patient. No  was used.     Review of patient's allergies indicates:  No Known Allergies  Past Medical History:   Diagnosis Date    Anxiety     Hypertension      Past Surgical History:   Procedure Laterality Date     SECTION       Family History   Problem Relation Age of Onset    No Known Problems Mother     No Known Problems Father      Social History     Tobacco Use    Smoking status: Current Every Day Smoker     Types: Cigarettes    Smokeless tobacco: Never Used   Substance Use Topics    Alcohol use: Yes     Comment: occ    Drug use: Yes     Types: Marijuana     Review of Systems   Constitutional: Negative for chills and fever.   HENT: Negative for ear pain and sore throat.    Eyes: Negative for pain.   Respiratory: Negative for cough and shortness of breath.    Cardiovascular: Positive for chest pain.   Gastrointestinal: Negative for abdominal pain, diarrhea, nausea and vomiting.   Genitourinary: Negative for dysuria and hematuria.   Musculoskeletal: Positive for back pain. Negative for myalgias (arm or leg pain).   Skin: Negative for rash.   Neurological: Negative for headaches.       Physical Exam     Initial " Vitals [08/24/18 1023]   BP Pulse Resp Temp SpO2   (!) 137/92 82 20 98.3 °F (36.8 °C) 99 %      MAP       --         Physical Exam    Nursing note and vitals reviewed.  Constitutional: She appears well-developed and well-nourished.   HENT:   Head: Normocephalic and atraumatic.   Eyes: Conjunctivae and EOM are normal.   Neck: Neck supple.   Cardiovascular: Normal rate and regular rhythm.   Pulmonary/Chest: Breath sounds normal. No respiratory distress. She has no wheezes.   pleuritic pain in nature   Abdominal: Soft. Bowel sounds are normal. She exhibits no distension. There is no tenderness.   Musculoskeletal: Normal range of motion.        Thoracic back: She exhibits tenderness.   Neurological: She is alert and oriented to person, place, and time.   Skin: Skin is warm and dry.   Psychiatric: She has a normal mood and affect. Her behavior is normal.         ED Course   Procedures  Labs Reviewed   CBC W/ AUTO DIFFERENTIAL   COMPREHENSIVE METABOLIC PANEL   TROPONIN I   TROPONIN I   LIPASE   URINALYSIS     EKG Readings: (Independently Interpreted)   Initial Reading: No STEMI.   Normal sinus rhythm at a rate of 76 normal QT normal intervals possible lead reversal will obtain a 2nd EKG    Sec EKG showed normal sinus rhythm no evidence of ischemia no ST elevation isoelectric at aVL no significant changes from November 2017       Imaging Results    None                     Scribe Attestation:   Scribe #1: I performed the above scribed service and the documentation accurately describes the services I performed. I attest to the accuracy of the note.    Attending Attestation:           Physician Attestation for Scribe:  Physician Attestation Statement for Scribe #1: I, Arturo Beck, reviewed documentation, as scribed by Hiren Schuler in my presence, and it is both accurate and complete.         Attending ED Notes:   I, Dr. Arturo Beck, personally performed the services described in this documentation. All medical  record entries made by the scribe were at my direction and in my presence.  I have reviewed the chart and agree that the record reflects my personal performance and is accurate and complete. Arturo Beck MD.  10:42 AM 08/24/2018    All labs unremarkable, more tender to palpation over muscle...musculoskeletal             Clinical Impression:   The encounter diagnosis was Chest discomfort.      Disposition:   Disposition: Discharged  Condition: Stable                        Arturo Smalls MD  08/25/18 0949

## 2022-08-22 ENCOUNTER — TELEPHONE (OUTPATIENT)
Dept: OBSTETRICS AND GYNECOLOGY | Facility: CLINIC | Age: 52
End: 2022-08-22
Payer: MEDICAID

## 2022-08-22 NOTE — TELEPHONE ENCOUNTER
Received a referral from [facility] for pt. Attempted to contact pt for scheduling if needed. Not a good working number

## 2022-10-25 ENCOUNTER — OFFICE VISIT (OUTPATIENT)
Dept: OBSTETRICS AND GYNECOLOGY | Facility: CLINIC | Age: 52
End: 2022-10-25
Payer: MEDICAID

## 2022-10-25 VITALS
BODY MASS INDEX: 21.26 KG/M2 | SYSTOLIC BLOOD PRESSURE: 124 MMHG | DIASTOLIC BLOOD PRESSURE: 72 MMHG | WEIGHT: 120.06 LBS

## 2022-10-25 DIAGNOSIS — Z12.31 BREAST CANCER SCREENING BY MAMMOGRAM: ICD-10-CM

## 2022-10-25 DIAGNOSIS — Z12.11 ENCOUNTER FOR SCREENING COLONOSCOPY: ICD-10-CM

## 2022-10-25 DIAGNOSIS — Z72.0 TOBACCO ABUSE: ICD-10-CM

## 2022-10-25 DIAGNOSIS — Z01.419 GYNECOLOGIC EXAM NORMAL: Primary | ICD-10-CM

## 2022-10-25 PROCEDURE — 99213 OFFICE O/P EST LOW 20 MIN: CPT | Mod: PBBFAC | Performed by: OBSTETRICS & GYNECOLOGY

## 2022-10-25 PROCEDURE — 3078F PR MOST RECENT DIASTOLIC BLOOD PRESSURE < 80 MM HG: ICD-10-PCS | Mod: CPTII,,, | Performed by: OBSTETRICS & GYNECOLOGY

## 2022-10-25 PROCEDURE — 1159F PR MEDICATION LIST DOCUMENTED IN MEDICAL RECORD: ICD-10-PCS | Mod: CPTII,,, | Performed by: OBSTETRICS & GYNECOLOGY

## 2022-10-25 PROCEDURE — 99386 PR PREVENTIVE VISIT,NEW,40-64: ICD-10-PCS | Mod: S$PBB,,, | Performed by: OBSTETRICS & GYNECOLOGY

## 2022-10-25 PROCEDURE — 3074F PR MOST RECENT SYSTOLIC BLOOD PRESSURE < 130 MM HG: ICD-10-PCS | Mod: CPTII,,, | Performed by: OBSTETRICS & GYNECOLOGY

## 2022-10-25 PROCEDURE — 99999 PR PBB SHADOW E&M-EST. PATIENT-LVL III: CPT | Mod: PBBFAC,,, | Performed by: OBSTETRICS & GYNECOLOGY

## 2022-10-25 PROCEDURE — 88175 CYTOPATH C/V AUTO FLUID REDO: CPT | Performed by: OBSTETRICS & GYNECOLOGY

## 2022-10-25 PROCEDURE — 3078F DIAST BP <80 MM HG: CPT | Mod: CPTII,,, | Performed by: OBSTETRICS & GYNECOLOGY

## 2022-10-25 PROCEDURE — 3074F SYST BP LT 130 MM HG: CPT | Mod: CPTII,,, | Performed by: OBSTETRICS & GYNECOLOGY

## 2022-10-25 PROCEDURE — 99386 PREV VISIT NEW AGE 40-64: CPT | Mod: S$PBB,,, | Performed by: OBSTETRICS & GYNECOLOGY

## 2022-10-25 PROCEDURE — 99999 PR PBB SHADOW E&M-EST. PATIENT-LVL III: ICD-10-PCS | Mod: PBBFAC,,, | Performed by: OBSTETRICS & GYNECOLOGY

## 2022-10-25 PROCEDURE — 87624 HPV HI-RISK TYP POOLED RSLT: CPT | Performed by: OBSTETRICS & GYNECOLOGY

## 2022-10-25 PROCEDURE — 1159F MED LIST DOCD IN RCRD: CPT | Mod: CPTII,,, | Performed by: OBSTETRICS & GYNECOLOGY

## 2022-10-25 RX ORDER — DOXYCYCLINE 100 MG/1
100 CAPSULE ORAL DAILY
COMMUNITY
Start: 2022-08-24

## 2022-10-25 RX ORDER — CLINDAMYCIN PHOSPHATE 11.9 MG/ML
SOLUTION TOPICAL 2 TIMES DAILY
COMMUNITY
Start: 2022-09-07

## 2022-10-25 NOTE — PROGRESS NOTES
Subjective:       Patient ID: Rula Baker is a 52 y.o. female.    Chief Complaint:  Annual Exam      History of Present Illness  HPI  Annual Exam-Premenopausal  Patient presents for annual exam. The patient has no complaints today. The patient is not currently sexually active. GYN screening history: last pap: approximate date 2-3 yrs ago and was normal. The patient wears seatbelts: yes. The patient participates in regular exercise: no. Has the patient ever been transfused or tattooed?: yes. The patient reports that there is not domestic violence in her life.    Pt has been skipping months of menses; but when she does have menses, it is heavy.    Pt needs mammogram.    Pt has never had colonoscopy.    GYN & OB History  Patient's last menstrual period was 2022.   Date of Last Pap: 10/25/2022    OB History    Para Term  AB Living   5         4   SAB IAB Ectopic Multiple Live Births           4      # Outcome Date GA Lbr Jatinder/2nd Weight Sex Delivery Anes PTL Lv   5             4             3             2             1                 Review of Systems  Review of Systems   Constitutional: Negative.    Respiratory: Negative.     Cardiovascular: Negative.    Gastrointestinal: Negative.    Genitourinary: Negative.    Musculoskeletal: Negative.    Neurological: Negative.    Hematological: Negative.    Psychiatric/Behavioral: Negative.     Breast: negative.          Objective:    Physical Exam:   Constitutional: She is oriented to person, place, and time. She appears well-developed and well-nourished. No distress.    HENT:   Head: Normocephalic and atraumatic.       Pulmonary/Chest: Effort normal. No respiratory distress. Right breast exhibits no inverted nipple, no mass, no nipple discharge, no skin change, no tenderness, presence, no bleeding, no swelling, no mastectomy, no augmentation and no lumpectomy. Left breast exhibits no inverted nipple, no mass, no  nipple discharge, no skin change, no tenderness, presence, no bleeding, no swelling, no mastectomy, no augmentation and no lumpectomy.        Abdominal: Soft. She exhibits no distension and no mass. There is no abdominal tenderness. There is no rebound and no guarding.     Genitourinary:    Vagina, uterus, right adnexa and left adnexa normal.      Pelvic exam was performed with patient supine.   The external female genitalia was normal.   No external genitalia lesions identified,Genitalia hair distrobution normal .   Labial bartholins normal.There is no rash, tenderness, lesion or injury on the right labia. There is no rash, tenderness, lesion or injury on the left labia. Cervix is normal. No no adexnal prolapse, no nodularity or no masses or organomegaly. Right adnexum displays no mass, no tenderness and no fullness. Left adnexum displays no mass, no tenderness and no fullness. Vagina exhibits no lesion. No erythema,  no vaginal discharge, tenderness, bleeding, rectocele, cystocele or unspecified prolapse of vaginal walls in the vagina.    No foreign body in the vagina.      No signs of injury in the vagina.   Vagina was moist.Cervix exhibits no motion tenderness, no lesion, no discharge, no friability, no lesion, no tenderness and no polyp.    pap smear completedUterus consistancy normal. and Uerus contour normal  Uterus is not deviated, not enlarged, not fixed, not tender, not hosting fibroids and no uterine prolapse. Normal urethral meatus.Urethral Meatus exhibits: urethral lesion and prolapsedUrethra findings: no urethral mass, no tenderness and no urethral scarringBladder findings: no bladder distention and no bladder tenderness          Musculoskeletal: Normal range of motion and moves all extremeties. No tenderness.       Neurological: She is alert and oriented to person, place, and time. No cranial nerve deficit. Coordination normal.    Skin: She is not diaphoretic.    Psychiatric: She has a normal mood and  affect. Her behavior is normal. Judgment and thought content normal.        Assessment:        1. Gynecologic exam normal    2. Breast cancer screening by mammogram    3. Tobacco abuse    4. Encounter for screening colonoscopy               Plan:       Stressed smoking, vaping and THC cessation  MMG ordered  Pap and HR HPV collected  Colonoscopy ordered

## 2022-11-02 ENCOUNTER — TELEPHONE (OUTPATIENT)
Dept: OBSTETRICS AND GYNECOLOGY | Facility: CLINIC | Age: 52
End: 2022-11-02
Payer: MEDICAID

## 2022-11-02 NOTE — TELEPHONE ENCOUNTER
----- Message from John Pearce MD sent at 11/1/2022  8:24 PM CDT -----  Call pt and tell normal Pap  ----- Message -----  From: [facility]  Sent: 10/28/2022   4:06 AM CDT  To: John Pearce MD

## 2022-11-12 DIAGNOSIS — Z12.11 ENCOUNTER FOR SCREENING COLONOSCOPY FOR NON-HIGH-RISK PATIENT: Primary | ICD-10-CM

## 2022-11-18 ENCOUNTER — HOSPITAL ENCOUNTER (OUTPATIENT)
Dept: RADIOLOGY | Facility: HOSPITAL | Age: 52
Discharge: HOME | End: 2022-11-18
Attending: OBSTETRICS & GYNECOLOGY
Payer: MEDICAID

## 2022-11-18 DIAGNOSIS — Z12.31 BREAST CANCER SCREENING BY MAMMOGRAM: ICD-10-CM

## 2022-11-18 PROCEDURE — 77063 BREAST TOMOSYNTHESIS BI: CPT | Mod: TC

## 2022-11-18 PROCEDURE — 77063 BREAST TOMOSYNTHESIS BI: CPT | Mod: 26,,, | Performed by: RADIOLOGY

## 2022-11-18 PROCEDURE — 77067 MAMMO DIGITAL SCREENING BILAT WITH TOMO: ICD-10-PCS | Mod: 26,,, | Performed by: RADIOLOGY

## 2022-11-18 PROCEDURE — 77067 SCR MAMMO BI INCL CAD: CPT | Mod: TC

## 2022-11-18 PROCEDURE — 77067 SCR MAMMO BI INCL CAD: CPT | Mod: 26,,, | Performed by: RADIOLOGY

## 2022-11-18 PROCEDURE — 77063 MAMMO DIGITAL SCREENING BILAT WITH TOMO: ICD-10-PCS | Mod: 26,,, | Performed by: RADIOLOGY

## 2022-11-22 ENCOUNTER — TELEPHONE (OUTPATIENT)
Dept: OBSTETRICS AND GYNECOLOGY | Facility: CLINIC | Age: 52
End: 2022-11-22
Payer: MEDICAID

## 2022-11-22 NOTE — TELEPHONE ENCOUNTER
----- Message from John Pearce MD sent at 11/21/2022  7:18 PM CST -----  Please contact the patient and let them know that their results of their mammogram were fine and to simply repeat in 1 year.

## 2023-02-24 ENCOUNTER — HOSPITAL ENCOUNTER (EMERGENCY)
Facility: HOSPITAL | Age: 53
Discharge: HOME | End: 2023-02-25
Attending: INTERNAL MEDICINE
Payer: MEDICAID

## 2023-02-24 DIAGNOSIS — R20.2 TINGLING OF RIGHT UPPER EXTREMITY: ICD-10-CM

## 2023-02-24 DIAGNOSIS — M79.601 PAIN OF RIGHT UPPER EXTREMITY: Primary | ICD-10-CM

## 2023-02-24 PROCEDURE — 63600175 PHARM REV CODE 636 W HCPCS: Performed by: EMERGENCY MEDICINE

## 2023-02-24 PROCEDURE — 99285 EMERGENCY DEPT VISIT HI MDM: CPT | Mod: 25

## 2023-02-24 PROCEDURE — 96372 THER/PROPH/DIAG INJ SC/IM: CPT | Performed by: EMERGENCY MEDICINE

## 2023-02-24 RX ORDER — DIAZEPAM 10 MG/2ML
5 INJECTION INTRAMUSCULAR
Status: COMPLETED | OUTPATIENT
Start: 2023-02-24 | End: 2023-02-24

## 2023-02-24 RX ORDER — KETOROLAC TROMETHAMINE 30 MG/ML
30 INJECTION, SOLUTION INTRAMUSCULAR; INTRAVENOUS
Status: COMPLETED | OUTPATIENT
Start: 2023-02-24 | End: 2023-02-24

## 2023-02-24 RX ADMIN — DIAZEPAM 5 MG: 10 INJECTION, SOLUTION INTRAMUSCULAR; INTRAVENOUS at 09:02

## 2023-02-24 RX ADMIN — KETOROLAC TROMETHAMINE 30 MG: 30 INJECTION, SOLUTION INTRAMUSCULAR; INTRAVENOUS at 09:02

## 2023-02-25 VITALS
OXYGEN SATURATION: 98 % | HEART RATE: 86 BPM | RESPIRATION RATE: 17 BRPM | DIASTOLIC BLOOD PRESSURE: 62 MMHG | SYSTOLIC BLOOD PRESSURE: 111 MMHG | TEMPERATURE: 99 F | WEIGHT: 120 LBS

## 2023-02-25 RX ORDER — ONDANSETRON 4 MG/1
4 TABLET, ORALLY DISINTEGRATING ORAL EVERY 6 HOURS PRN
Qty: 15 TABLET | Refills: 0 | Status: SHIPPED | OUTPATIENT
Start: 2023-02-25

## 2023-02-25 RX ORDER — ACETAMINOPHEN 500 MG
500 TABLET ORAL EVERY 6 HOURS PRN
Qty: 28 TABLET | Refills: 0 | Status: SHIPPED | OUTPATIENT
Start: 2023-02-25 | End: 2023-03-04

## 2023-02-25 RX ORDER — GABAPENTIN 100 MG/1
100 CAPSULE ORAL 3 TIMES DAILY
Qty: 90 CAPSULE | Refills: 0 | Status: SHIPPED | OUTPATIENT
Start: 2023-02-25 | End: 2023-03-27

## 2023-02-25 RX ORDER — IBUPROFEN 600 MG/1
600 TABLET ORAL EVERY 6 HOURS PRN
Qty: 20 TABLET | Refills: 0 | Status: SHIPPED | OUTPATIENT
Start: 2023-02-25 | End: 2023-12-19

## 2023-02-25 NOTE — DISCHARGE INSTRUCTIONS
Thank you for coming to our Emergency Department today. It is important to remember that some problems are difficult to diagnose and may not be found during your first visit. Be sure to follow up with your primary care doctor and review any labs/imaging that was performed with them. If you do not have a primary care doctor, you may contact the one listed on your discharge paperwork or you may also call the [facility] Appointment Desk at [phone] to schedule an appointment with one.     All medications may potentially have side effects and it is impossible to predict which medications may give you side effects. If you feel that you are having a negative effect of any medication you should immediately stop taking them and seek medical attention.    Return to the ER with any questions/concerns, new/concerning symptoms, worsening or failure to improve. Do not drive or make any important decisions for 24 hours if you have received any pain medications, sedatives or mood altering drugs during your ER visit.

## 2023-02-25 NOTE — ED TRIAGE NOTES
"Pt to ED with c/o arm pain x3 days. Pt states intermittent tingling of R arm accompanied with N/V and "hot flashes" denies dizziness, HA, SOB, or fever. Pt is AAOx4, NAD, VSS.   "

## 2023-02-25 NOTE — ED PROVIDER NOTES
"Encounter Date: 2/24/2023       History     Chief Complaint   Patient presents with    Arm Pain     Pt says that she has had intermittent sensations of tingling and pain in her right arm x3 days. Denies injury. Tingling/numbness is not present at this time but the pain is. Denies unilateral weakness, slurred speech, facial droop     Rula Cabral is a 52-year-old female with no pertinent past medical history who presents to the emergency department with a chief complaint of right upper extremity pain tingling.  Denies having any prior episodes similar to this.  She reports that she has intermittent, "random" pain on her right upper arm that radiates down to her right hand.  Reports that the severity is anywhere from 0 to 8/10 at any given time.  She reports associated tingling sensation, mostly in the forearm and hand, worse on the medial side than the lateral side.  She has taken Tylenol and aspirin at home for her symptoms with minimal relief.  She also reports associated nausea.  She denies any known alleviating or aggravating factors.  Pain and tingling are not reproducible with any specific movements.    The history is provided by the patient. No  was used.   Review of patient's allergies indicates:  Not on File  No past medical history on file.  No past surgical history on file.  No family history on file.     Review of Systems   Constitutional:  Negative for chills and fever.   HENT:  Negative for sore throat.    Respiratory:  Negative for shortness of breath.    Cardiovascular:  Negative for chest pain.   Gastrointestinal:  Negative for nausea.   Genitourinary:  Negative for dysuria.   Musculoskeletal:  Positive for myalgias (Right upper and forearm). Negative for back pain, joint swelling, neck pain and neck stiffness.   Skin:  Negative for rash.   Neurological:  Negative for weakness and numbness.        Positive for paresthesias   Hematological:  Does not bruise/bleed easily. "     Physical Exam     Initial Vitals   BP Pulse Resp Temp SpO2   02/24/23 2119 02/24/23 2119 02/24/23 2119 02/24/23 2122 02/24/23 2119   (!) 140/90 91 17 99.2 °F (37.3 °C) 100 %      MAP       --                Physical Exam    Nursing note and vitals reviewed.  Constitutional: Vital signs are normal. She appears well-developed and well-nourished. She is cooperative. She does not appear ill. No distress.   HENT:   Head: Normocephalic and atraumatic.   Right Ear: Hearing and external ear normal.   Left Ear: Hearing and external ear normal.   Nose: Nose normal.   Eyes: Conjunctivae and EOM are normal.   Neck: Phonation normal.   Normal range of motion.  Cardiovascular:  Normal rate and regular rhythm.           No murmur heard.  Pulmonary/Chest: Effort normal. No respiratory distress.   Abdominal: Abdomen is soft. She exhibits no distension. There is no abdominal tenderness.   Musculoskeletal:      Right shoulder: Normal. No deformity, tenderness or bony tenderness. Normal range of motion.      Right upper arm: Normal. No swelling, deformity, tenderness or bony tenderness.      Right elbow: Normal. No deformity. Normal range of motion. No tenderness.      Right forearm: Normal. No swelling, tenderness or bony tenderness.      Right wrist: Normal. No deformity, tenderness, bony tenderness or snuff box tenderness. Normal range of motion.      Right hand: Normal. No swelling, deformity, tenderness or bony tenderness. Normal range of motion. Normal strength. Normal sensation. Normal capillary refill.      Cervical back: Normal range of motion.      Comments: Normal musculoskeletal exam of the bilateral upper extremities.  Full active and passive range of motion of all digits, wrists, elbows, and shoulders bilaterally.  Strength 5/5 in bilateral wrists, elbows, and shoulders.  Shoulder shrug symmetrical bilaterally.  Normal sensation in all 5 fingertips, forearms, upper arms, and shoulders bilaterally.  Capillary refill  less than 2 seconds in all 5 fingertips bilaterally.     Neurological: She is alert and oriented to person, place, and time. She has normal strength. She displays no atrophy and no tremor. No cranial nerve deficit or sensory deficit. She displays no seizure activity. Coordination and gait normal. GCS eye subscore is 4. GCS verbal subscore is 5. GCS motor subscore is 6.   Normal neurological exam with no focal neurological deficits.  Cranial nerves 2-12 grossly normal.  Ambulatory with steady gait.  Tandem walk steady.  Pupils equal round and reactive to light.  Motor function and sensation are present and symmetrical in bilateral upper and lower extremities.  Rapid alternating movements intact in bilateral upper extremities.    Skin: Skin is warm. Capillary refill takes less than 2 seconds.       ED Course   Procedures  Labs Reviewed - No data to display       Imaging Results              CT Cervical Spine Without Contrast (Final result)  Result time 02/24/23 23:58:42      Final result by Tessie De La Rosa MD (02/24/23 23:58:42)                   Impression:      No acute cervical spine abnormalities identified.      Electronically signed by: Tessie De La Rosa MD  Date:    02/24/2023  Time:    23:58               Narrative:    EXAMINATION:  CT CERVICAL SPINE WITHOUT CONTRAST    CLINICAL HISTORY:  Cervical radiculopathy, no red flags;    TECHNIQUE:  Low dose axial images, sagittal and coronal reformations were performed though the cervical spine.  Contrast was not administered.    COMPARISON:  None    FINDINGS:  No evidence of acute cervical spine fracture or dislocation.  Odontoid process is intact.  Craniocervical junction is unremarkable.  Cervical spine alignment is within normal limits.  Mild intervertebral disc space narrowing and degenerative spurring are seen at the C5-6 and C6-7 levels.    Surrounding soft tissues show no significant abnormalities.  Airway is patent.  Partially visualized lung apices are  clear.                                    X-Rays:   Independently Interpreted Readings:   Other Readings:  CT cervical spine without contrast:  No acute fractures or dislocations.  No spondylolisthesis, spondylolysis, or other signs of malalignment.  Medications   diazePAM injection 5 mg (5 mg Intramuscular Given 2/24/23 2150)   ketorolac injection 30 mg (30 mg Intramuscular Given 2/24/23 2149)     Medical Decision Making:   Initial Assessment:   52-year-old female presenting to the emergency department with a chief complaint of atraumatic right arm pain and tingling.  Differential Diagnosis:   Differential diagnosis includes but is not limited to contusion, ligamentous injury, muscular injury, fracture, dislocation, or nerve injury of the right forearm, upper arm, shoulder, or neck.  Clinical Tests:   Radiological Study: Ordered and Reviewed  ED Management:  Patient presenting to the emergency department with a 3 day history of atraumatic pain and tingling in the right arm.  Can not recall any inciting incident may have led to these symptoms.  She denies any other neurological symptoms including weakness or facial droop unilaterally.  On physical exam, patient was in no acute distress and all vital signs were within normal limits.  Normal neurological exam with no focal neurological deficits.  Normal musculoskeletal exam of the bilateral upper extremities.  CT of the neck was obtained to assess for any cervical spine pathology that may be causing a pinched nerve were other sources of this paresthesia, but the CT of the cervical spine returned with no acute abnormalities.  Patient was treated with 1 dose of Toradol as well as 1 dose of diazepam in the emergency department with resolution of symptoms.  Presentation is consistent with musculoskeletal arm pain. No etiology of the patient's paresthesias identified at this time.  We will refer to Neurology in the case that the patient's symptoms do not resolve.  Patient  was also given prescriptions for Tylenol, Motrin, and gabapentin for symptomatic relief at home.    Patient also reported nausea, although she had not vomited.  Given a prescription for Zofran for symptomatic control at home.    Patient had a completely normal neurological exam with no focal findings.  Doubt acute CVA or TIA at this time.  With a normal CT scan of the cervical spine, I doubt acute fracture or dislocation.  No history of diabetes, potential neuropathy is in a unilateral upper extremity, so I doubt diabetic polyneuropathy at this time.    Return precautions were discussed, all patient questions were answered, and the patient was agreeable to the plan of care.  She was discharged home in stable condition and will follow up with her primary care provider or return to the emergency department if her symptoms worsen or do not improve.                         Clinical Impression:   Final diagnoses:  [M79.601] Pain of right upper extremity (Primary)  [R20.2] Tingling of right upper extremity        ED Disposition Condition    Discharge Stable          ED Prescriptions       Medication Sig Dispense Start Date End Date Auth. Provider    acetaminophen (TYLENOL) 500 MG tablet Take 1 tablet (500 mg total) by mouth every 6 (six) hours as needed for Pain. 28 tablet 2/25/2023 3/4/2023 Jayesh Mullen PA-C    ibuprofen (ADVIL,MOTRIN) 600 MG tablet Take 1 tablet (600 mg total) by mouth every 6 (six) hours as needed for Pain. 20 tablet 2/25/2023 -- Jayesh Mullen PA-C    gabapentin (NEURONTIN) 100 MG capsule Take 1 capsule (100 mg total) by mouth 3 (three) times daily. 90 capsule 2/25/2023 3/27/2023 Jayesh Mullen PA-C    ondansetron (ZOFRAN-ODT) 4 MG TbDL Take 1 tablet (4 mg total) by mouth every 6 (six) hours as needed (Nausea). 15 tablet 2/25/2023 -- Jayesh Mullen PA-C          Follow-up Information       Follow up With Specialties Details Why Contact Info    [facility]  Schedule an  appointment as soon as possible for a visit in 1 week If symptoms worsen, As needed [address]  [facility] [address]  [phone]      [facility] - Emergency Dept Emergency Medicine Go to  If symptoms worsen [address]  [facility] [id number] [phone]             Jayesh Mullen PA-C  02/25/23 0242

## 2023-03-31 ENCOUNTER — HOSPITAL ENCOUNTER (EMERGENCY)
Facility: HOSPITAL | Age: 53
Discharge: HOME | End: 2023-03-31
Attending: EMERGENCY MEDICINE
Payer: MEDICAID

## 2023-03-31 VITALS
HEIGHT: 60 IN | RESPIRATION RATE: 20 BRPM | DIASTOLIC BLOOD PRESSURE: 98 MMHG | SYSTOLIC BLOOD PRESSURE: 156 MMHG | WEIGHT: 119 LBS | TEMPERATURE: 98 F | BODY MASS INDEX: 23.36 KG/M2 | OXYGEN SATURATION: 95 % | HEART RATE: 75 BPM

## 2023-03-31 DIAGNOSIS — M25.511 CHRONIC RIGHT SHOULDER PAIN: Primary | ICD-10-CM

## 2023-03-31 DIAGNOSIS — G89.29 CHRONIC RIGHT SHOULDER PAIN: Primary | ICD-10-CM

## 2023-03-31 DIAGNOSIS — M79.603 ARM PAIN: ICD-10-CM

## 2023-03-31 PROCEDURE — 63600175 PHARM REV CODE 636 W HCPCS: Performed by: EMERGENCY MEDICINE

## 2023-03-31 PROCEDURE — 25000003 PHARM REV CODE 250: Performed by: EMERGENCY MEDICINE

## 2023-03-31 PROCEDURE — 93010 EKG 12-LEAD: ICD-10-PCS | Mod: ,,, | Performed by: INTERNAL MEDICINE

## 2023-03-31 PROCEDURE — 93010 ELECTROCARDIOGRAM REPORT: CPT | Mod: ,,, | Performed by: INTERNAL MEDICINE

## 2023-03-31 PROCEDURE — 99284 EMERGENCY DEPT VISIT MOD MDM: CPT

## 2023-03-31 PROCEDURE — 96372 THER/PROPH/DIAG INJ SC/IM: CPT | Performed by: EMERGENCY MEDICINE

## 2023-03-31 PROCEDURE — 93005 ELECTROCARDIOGRAM TRACING: CPT

## 2023-03-31 RX ORDER — NAPROXEN 500 MG/1
500 TABLET ORAL 2 TIMES DAILY
Qty: 20 TABLET | Refills: 0 | Status: SHIPPED | OUTPATIENT
Start: 2023-03-31

## 2023-03-31 RX ORDER — KETOROLAC TROMETHAMINE 30 MG/ML
15 INJECTION, SOLUTION INTRAMUSCULAR; INTRAVENOUS
Status: COMPLETED | OUTPATIENT
Start: 2023-03-31 | End: 2023-03-31

## 2023-03-31 RX ORDER — LIDOCAINE 50 MG/G
1 PATCH TOPICAL
Status: DISCONTINUED | OUTPATIENT
Start: 2023-03-31 | End: 2023-04-01 | Stop reason: HOSPADM

## 2023-03-31 RX ORDER — ORPHENADRINE CITRATE 100 MG/1
100 TABLET, EXTENDED RELEASE ORAL 2 TIMES DAILY
Qty: 20 TABLET | Refills: 0 | Status: SHIPPED | OUTPATIENT
Start: 2023-03-31 | End: 2023-04-10

## 2023-03-31 RX ORDER — LIDOCAINE 50 MG/G
1 PATCH TOPICAL ONCE
Qty: 15 PATCH | Refills: 0 | Status: SHIPPED | OUTPATIENT
Start: 2023-03-31 | End: 2023-03-31

## 2023-03-31 RX ADMIN — LIDOCAINE 1 PATCH: 50 PATCH TOPICAL at 09:03

## 2023-03-31 RX ADMIN — KETOROLAC TROMETHAMINE 15 MG: 30 INJECTION, SOLUTION INTRAMUSCULAR at 09:03

## 2023-04-01 NOTE — DISCHARGE INSTRUCTIONS

## 2023-04-01 NOTE — ED PROVIDER NOTES
Encounter Date: 3/31/2023       History     Chief Complaint   Patient presents with    Arm Pain     Right sided arm pain for about a month, reports pain starts in the right upper back and shoulder radiates down to fingers. Reports pain is getting worse, denies any injuries, taking ibuprofen no relief. Seen and evaluated previously for same.     52-year-old female with past medical history of hypertension presents to ED complaining of one-month history of right shoulder pain that radiates to her right thoracic back and down her right-sided fingers.  She denies any associated trauma.  She is attempted ibuprofen and Tylenol with transient relief.  She denies any saddle anesthesia, bladder/bowel incontinence, chest pain, shortness of breath, fever, chills, abdominal pain, nausea, vomiting, diarrhea, dysuria, hematuria.  Patient states that she was seen within the past month for symptoms and was advised to follow-up with orthopedist; however, she was unable to make the appointment.  No other symptoms reported.  Patient is in menopause.    The history is provided by the patient. No  was used.   Review of patient's allergies indicates:  No Known Allergies  Past Medical History:   Diagnosis Date    Anxiety     Hypertension      Past Surgical History:   Procedure Laterality Date     SECTION       Family History   Problem Relation Age of Onset    No Known Problems Mother     No Known Problems Father     Breast cancer Maternal Aunt      Social History     Tobacco Use    Smoking status: Every Day     Types: Cigarettes    Smokeless tobacco: Never   Substance Use Topics    Alcohol use: Yes     Comment: occ    Drug use: Yes     Types: Marijuana     Review of Systems   Constitutional:  Negative for chills and fever.   HENT:  Negative for congestion, ear pain, rhinorrhea and sore throat.    Eyes:  Negative for redness.   Respiratory:  Negative for cough and shortness of breath.    Cardiovascular:   Negative for chest pain.   Gastrointestinal:  Negative for abdominal pain, diarrhea, nausea and vomiting.   Genitourinary:  Negative for decreased urine volume, difficulty urinating, dysuria, frequency, hematuria and urgency.        (-) bladder/bowel incontinence  (-) saddle anesthesia   Musculoskeletal:  Positive for arthralgias and back pain. Negative for neck pain.   Skin:  Negative for rash.   Neurological:  Negative for headaches.   Psychiatric/Behavioral:  Negative for confusion.      Physical Exam     Initial Vitals [03/31/23 2057]   BP Pulse Resp Temp SpO2   (!) 156/98 75 20 98 °F (36.7 °C) 95 %      MAP       --         Physical Exam    Nursing note and vitals reviewed.  Constitutional: She appears well-developed and well-nourished.  Non-toxic appearance. She does not appear ill.   HENT:   Head: Normocephalic and atraumatic.   Right Ear: Hearing, tympanic membrane, external ear and ear canal normal. Tympanic membrane is not perforated, not erythematous and not bulging.   Left Ear: Hearing, tympanic membrane, external ear and ear canal normal. Tympanic membrane is not perforated, not erythematous and not bulging.   Nose: Nose normal.   Mouth/Throat: Uvula is midline, oropharynx is clear and moist and mucous membranes are normal.   Eyes: Conjunctivae and EOM are normal.   Neck: Neck supple.   Normal range of motion.   Full passive range of motion without pain.     Cardiovascular:  Normal rate and regular rhythm.           Pulses:       Radial pulses are 2+ on the right side and 2+ on the left side.   Pulmonary/Chest: Effort normal and breath sounds normal. No accessory muscle usage. No respiratory distress. She has no decreased breath sounds.   Abdominal: Abdomen is soft. Bowel sounds are normal. She exhibits no distension. There is no abdominal tenderness.   No right CVA tenderness.  No left CVA tenderness. There is no rebound and no guarding.   Musculoskeletal:         General: Normal range of motion.       Cervical back: Full passive range of motion without pain, normal range of motion and neck supple. No rigidity.      Comments:  Full range of motion of bilateral shoulders, elbows, wrists, fingers.  Strength and sensation intact to bilateral upper extremities.  No midline tenderness to cervical, thoracic, lumbar spine.  No bony step-offs.  Full range motion of bilateral lower extremities.  Patient able to ambulate into the room.  No surrounding erythema, edema, bruising, rash, or cellulitis to patient's back or upper extremities.      Neurological: She is alert. No cranial nerve deficit.   Neuro intact.  Strength and sensation intact bilateral upper and lower extremities.   Skin: Skin is warm and dry.   Psychiatric: She has a normal mood and affect.       ED Course   Procedures  Labs Reviewed - No data to display       Imaging Results    None          Medications   ketorolac injection 15 mg (15 mg Intramuscular Given 3/31/23 2130)     Medical Decision Making:   ED Management:  This is a 52-year-old female with past medical history of hypertension presents to ED complaining of one-month history of right shoulder pain that radiates to her right thoracic back and down her right-sided fingers. On physical exam, patient is well-appearing and in no acute distress.  Nontoxic appearing.  Lungs are clear to auscultation bilaterally.  Abdomen is soft and nontender.  No guarding, rigidity, rebound.  2+ radial pulses bilaterally.  Posterior oropharynx is not erythematous.  No edema or exudate.  Uvula midline.  Bilateral tympanic membrane is normal.  No erythema, bulging, or perforations.  Neuro intact.  Strength and sensation intact bilateral upper and lower extremities.  Full range of motion of bilateral shoulders, elbows, wrists, fingers.  Strength and sensation intact to bilateral upper extremities.  No midline tenderness to cervical, thoracic, lumbar spine.  No bony step-offs.  Full range motion of bilateral lower  extremities.  Patient able to ambulate into the room.  No surrounding erythema, edema, bruising, rash, or cellulitis to patient's back or upper extremities.  No CVA tenderness bilaterally.  Full range motion neck.  No neck rigidity.  Without any new trauma, I do not believe patient needs any imaging at this time.  Toradol ordered.  Will discharge patient on naproxen, Norflex, and Lidoderm patches.  Ambulatory referral to orthopedist ordered.  Urged prompt follow-up with orthopedist and PCP for further evaluation.    Strict return precautions given. I discussed with the patient/family the diagnosis, treatment plan, indications for return to the emergency department, and for expected follow-up. The patient/family verbalized an understanding. The patient/family is asked if there are any questions or concerns. We discuss the case, until all issues are addressed to the patient/family's satisfaction. Patient/family understands and is agreeable to the plan. Patient is stable and ready for discharge.                          Clinical Impression:   Final diagnoses:  [M79.603] Arm pain  [M25.511, G89.29] Chronic right shoulder pain (Primary)        ED Disposition Condition    Discharge Stable          ED Prescriptions       Medication Sig Dispense Start Date End Date Auth. Provider    naproxen (NAPROSYN) 500 MG tablet Take 1 tablet (500 mg total) by mouth 2 (two) times daily. 20 tablet 3/31/2023 -- Ana M Cartagena PA-C    orphenadrine (NORFLEX) 100 mg tablet Take 1 tablet (100 mg total) by mouth 2 (two) times daily. for 10 days 20 tablet 3/31/2023 4/10/2023 Ana M Cartagena PA-C    LIDOcaine (LIDODERM) 5 % () Place 1 patch onto the skin once. Remove & Discard patch within 12 hours or as directed by MD for 1 dose 15 patch 3/31/2023 3/31/2023 Ana M Cartagena PA-C          Follow-up Information       Follow up With Specialties Details Why Contact Info    Tyson Patel MD Internal Medicine In 2 days for further evaluation  [address] [facility] [address]  [phone]      [facility] - Emergency Dept Emergency Medicine In 2 days If symptoms worsen [address]  [facility] [id number] [phone]             Ana M Cartagena PA-C  04/01/23 0103

## 2023-04-03 ENCOUNTER — TELEPHONE (OUTPATIENT)
Dept: ORTHOPEDICS | Facility: CLINIC | Age: 53
End: 2023-04-03
Payer: MEDICAID

## 2023-04-03 NOTE — TELEPHONE ENCOUNTER
----- Message from Erika Portillo sent at 4/3/2023 12:48 PM CDT -----  Type:  Sooner Apoointment Request    Caller is requesting a sooner appointment.  Caller declined first available appointment listed below.  Caller will not accept being placed on the waitlist and is requesting a message be sent to doctor.  Name of Caller:pt   When is the first available appointment?   Symptoms:pt has referral   Would the patient rather a call back or a [facility]? [facility]   Best Call Back Number:[phone]  Additional Information: pt has a referral

## 2023-12-19 PROBLEM — F41.9 ANXIETY: Status: ACTIVE | Noted: 2023-12-19

## 2023-12-19 PROBLEM — I10 HYPERTENSION: Status: ACTIVE | Noted: 2023-12-19

## 2023-12-22 PROBLEM — R73.03 PREDIABETES: Status: ACTIVE | Noted: 2023-12-22

## 2023-12-28 ENCOUNTER — HOSPITAL ENCOUNTER (OUTPATIENT)
Dept: RADIOLOGY | Facility: HOSPITAL | Age: 53
Discharge: HOME | End: 2023-12-28
Attending: INTERNAL MEDICINE
Payer: MEDICAID

## 2023-12-28 DIAGNOSIS — Z12.31 ENCOUNTER FOR SCREENING MAMMOGRAM FOR BREAST CANCER: ICD-10-CM

## 2023-12-28 PROCEDURE — 77067 MAMMO DIGITAL SCREENING BILAT WITH TOMO: ICD-10-PCS | Mod: 26,,, | Performed by: RADIOLOGY

## 2023-12-28 PROCEDURE — 77067 SCR MAMMO BI INCL CAD: CPT | Mod: TC

## 2023-12-28 PROCEDURE — 77063 MAMMO DIGITAL SCREENING BILAT WITH TOMO: ICD-10-PCS | Mod: 26,,, | Performed by: RADIOLOGY

## 2023-12-28 PROCEDURE — 77067 SCR MAMMO BI INCL CAD: CPT | Mod: 26,,, | Performed by: RADIOLOGY

## 2023-12-28 PROCEDURE — 77063 BREAST TOMOSYNTHESIS BI: CPT | Mod: 26,,, | Performed by: RADIOLOGY

## 2023-12-29 ENCOUNTER — TELEPHONE (OUTPATIENT)
Dept: ENDOSCOPY | Facility: HOSPITAL | Age: 53
End: 2023-12-29
Payer: MEDICAID

## 2023-12-29 VITALS — BODY MASS INDEX: 22.38 KG/M2 | WEIGHT: 114 LBS | HEIGHT: 60 IN

## 2023-12-29 DIAGNOSIS — Z12.11 ENCOUNTER FOR COLORECTAL CANCER SCREENING: Primary | ICD-10-CM

## 2023-12-29 DIAGNOSIS — Z12.11 SPECIAL SCREENING FOR MALIGNANT NEOPLASMS, COLON: ICD-10-CM

## 2023-12-29 DIAGNOSIS — Z12.12 ENCOUNTER FOR COLORECTAL CANCER SCREENING: Primary | ICD-10-CM

## 2024-03-25 ENCOUNTER — TELEPHONE (OUTPATIENT)
Dept: ENDOSCOPY | Facility: HOSPITAL | Age: 54
End: 2024-03-25